# Patient Record
Sex: MALE | Race: BLACK OR AFRICAN AMERICAN | NOT HISPANIC OR LATINO | Employment: PART TIME | URBAN - METROPOLITAN AREA
[De-identification: names, ages, dates, MRNs, and addresses within clinical notes are randomized per-mention and may not be internally consistent; named-entity substitution may affect disease eponyms.]

---

## 2018-12-07 ENCOUNTER — LAB (OUTPATIENT)
Dept: LAB | Facility: HOSPITAL | Age: 58
End: 2018-12-07

## 2018-12-07 ENCOUNTER — OFFICE VISIT (OUTPATIENT)
Dept: FAMILY MEDICINE CLINIC | Facility: CLINIC | Age: 58
End: 2018-12-07

## 2018-12-07 VITALS
DIASTOLIC BLOOD PRESSURE: 72 MMHG | HEART RATE: 70 BPM | WEIGHT: 164 LBS | SYSTOLIC BLOOD PRESSURE: 116 MMHG | OXYGEN SATURATION: 99 %

## 2018-12-07 DIAGNOSIS — Z13.0 SCREENING FOR DEFICIENCY ANEMIA: ICD-10-CM

## 2018-12-07 DIAGNOSIS — Z13.220 SCREENING, LIPID: ICD-10-CM

## 2018-12-07 DIAGNOSIS — Z11.59 ENCOUNTER FOR SCREENING FOR OTHER VIRAL DISEASES: ICD-10-CM

## 2018-12-07 DIAGNOSIS — Z12.5 SCREENING FOR MALIGNANT NEOPLASM OF PROSTATE: ICD-10-CM

## 2018-12-07 DIAGNOSIS — H10.403 CHRONIC CONJUNCTIVITIS OF BOTH EYES, UNSPECIFIED CHRONIC CONJUNCTIVITIS TYPE: Primary | ICD-10-CM

## 2018-12-07 DIAGNOSIS — Z13.29 SCREENING FOR THYROID DISORDER: ICD-10-CM

## 2018-12-07 DIAGNOSIS — Z23 NEED FOR IMMUNIZATION AGAINST INFLUENZA: ICD-10-CM

## 2018-12-07 DIAGNOSIS — Z13.1 SCREENING FOR DIABETES MELLITUS: ICD-10-CM

## 2018-12-07 LAB
ALBUMIN SERPL-MCNC: 4.11 G/DL (ref 3.2–4.8)
ALBUMIN/GLOB SERPL: 2.1 G/DL (ref 1.5–2.5)
ALP SERPL-CCNC: 65 U/L (ref 25–100)
ALT SERPL W P-5'-P-CCNC: 21 U/L (ref 7–40)
ANION GAP SERPL CALCULATED.3IONS-SCNC: 3 MMOL/L (ref 3–11)
ARTICHOKE IGE QN: 120 MG/DL (ref 0–130)
AST SERPL-CCNC: 27 U/L (ref 0–33)
BASOPHILS # BLD AUTO: 0.02 10*3/MM3 (ref 0–0.2)
BASOPHILS NFR BLD AUTO: 0.5 % (ref 0–1)
BILIRUB SERPL-MCNC: 0.9 MG/DL (ref 0.3–1.2)
BUN BLD-MCNC: 13 MG/DL (ref 9–23)
BUN/CREAT SERPL: 18.3 (ref 7–25)
CALCIUM SPEC-SCNC: 9.1 MG/DL (ref 8.7–10.4)
CHLORIDE SERPL-SCNC: 106 MMOL/L (ref 99–109)
CHOLEST SERPL-MCNC: 195 MG/DL (ref 0–200)
CO2 SERPL-SCNC: 29 MMOL/L (ref 20–31)
CREAT BLD-MCNC: 0.71 MG/DL (ref 0.6–1.3)
DEPRECATED RDW RBC AUTO: 40.1 FL (ref 37–54)
EOSINOPHIL # BLD AUTO: 0.36 10*3/MM3 (ref 0–0.3)
EOSINOPHIL NFR BLD AUTO: 8.9 % (ref 0–3)
ERYTHROCYTE [DISTWIDTH] IN BLOOD BY AUTOMATED COUNT: 12.7 % (ref 11.3–14.5)
GFR SERPL CREATININE-BSD FRML MDRD: 137 ML/MIN/1.73
GLOBULIN UR ELPH-MCNC: 2 GM/DL
GLUCOSE BLD-MCNC: 86 MG/DL (ref 70–100)
HCT VFR BLD AUTO: 46.8 % (ref 38.9–50.9)
HCV AB SER DONR QL: NORMAL
HDLC SERPL-MCNC: 56 MG/DL (ref 40–60)
HGB BLD-MCNC: 15.8 G/DL (ref 13.1–17.5)
IMM GRANULOCYTES # BLD: 0 10*3/MM3 (ref 0–0.03)
IMM GRANULOCYTES NFR BLD: 0 % (ref 0–0.6)
LYMPHOCYTES # BLD AUTO: 1.92 10*3/MM3 (ref 0.6–4.8)
LYMPHOCYTES NFR BLD AUTO: 47.3 % (ref 24–44)
MCH RBC QN AUTO: 29.2 PG (ref 27–31)
MCHC RBC AUTO-ENTMCNC: 33.8 G/DL (ref 32–36)
MCV RBC AUTO: 86.3 FL (ref 80–99)
MONOCYTES # BLD AUTO: 0.47 10*3/MM3 (ref 0–1)
MONOCYTES NFR BLD AUTO: 11.6 % (ref 0–12)
NEUTROPHILS # BLD AUTO: 1.29 10*3/MM3 (ref 1.5–8.3)
NEUTROPHILS NFR BLD AUTO: 31.7 % (ref 41–71)
PLATELET # BLD AUTO: 212 10*3/MM3 (ref 150–450)
PMV BLD AUTO: 10.6 FL (ref 6–12)
POTASSIUM BLD-SCNC: 4.2 MMOL/L (ref 3.5–5.5)
PROT SERPL-MCNC: 6.1 G/DL (ref 5.7–8.2)
PSA SERPL-MCNC: 2.09 NG/ML (ref 0–4)
RBC # BLD AUTO: 5.42 10*6/MM3 (ref 4.2–5.76)
SODIUM BLD-SCNC: 138 MMOL/L (ref 132–146)
TRIGL SERPL-MCNC: 106 MG/DL (ref 0–150)
TSH SERPL DL<=0.05 MIU/L-ACNC: 2.05 MIU/ML (ref 0.35–5.35)
WBC NRBC COR # BLD: 4.06 10*3/MM3 (ref 3.5–10.8)

## 2018-12-07 PROCEDURE — G0103 PSA SCREENING: HCPCS | Performed by: FAMILY MEDICINE

## 2018-12-07 PROCEDURE — 80061 LIPID PANEL: CPT | Performed by: FAMILY MEDICINE

## 2018-12-07 PROCEDURE — 80053 COMPREHEN METABOLIC PANEL: CPT | Performed by: FAMILY MEDICINE

## 2018-12-07 PROCEDURE — 86803 HEPATITIS C AB TEST: CPT | Performed by: FAMILY MEDICINE

## 2018-12-07 PROCEDURE — 90686 IIV4 VACC NO PRSV 0.5 ML IM: CPT | Performed by: FAMILY MEDICINE

## 2018-12-07 PROCEDURE — 90471 IMMUNIZATION ADMIN: CPT | Performed by: FAMILY MEDICINE

## 2018-12-07 PROCEDURE — 84443 ASSAY THYROID STIM HORMONE: CPT | Performed by: FAMILY MEDICINE

## 2018-12-07 PROCEDURE — 99204 OFFICE O/P NEW MOD 45 MIN: CPT | Performed by: FAMILY MEDICINE

## 2018-12-07 PROCEDURE — 85025 COMPLETE CBC W/AUTO DIFF WBC: CPT | Performed by: FAMILY MEDICINE

## 2018-12-07 RX ORDER — KETOTIFEN FUMARATE 0.35 MG/ML
1 SOLUTION/ DROPS OPHTHALMIC 2 TIMES DAILY
Qty: 1 EACH | Refills: 2 | OUTPATIENT
Start: 2018-12-07 | End: 2020-01-04

## 2018-12-07 NOTE — PROGRESS NOTES
Ulises Hairston presents today to establish care.    Chief Complaint   Patient presents with   • Eye Problem     pain, watery    Declined .     Eye Problem    Both eyes are affected.This is a new problem. The current episode started more than 1 year ago (2010). The problem occurs intermittently. The problem has been unchanged. There was no injury mechanism. The pain is at a severity of 7/10. Associated symptoms include blurred vision, eye redness and a foreign body sensation. Pertinent negatives include no eye discharge or itching. He has tried nothing for the symptoms.           Review of Systems   Constitutional: Negative.    HENT: Negative.    Eyes: Positive for blurred vision, pain and redness. Negative for discharge and itching.   Respiratory: Negative.    Cardiovascular: Negative.    Gastrointestinal: Negative.    Endocrine: Negative.    Genitourinary: Negative.    Musculoskeletal: Negative.    Skin: Negative.    Neurological: Negative.    Hematological: Negative.    Psychiatric/Behavioral: Negative.         History reviewed. No pertinent past medical history.     History reviewed. No pertinent surgical history.     History reviewed. No pertinent family history.     Social History     Socioeconomic History   • Marital status:      Spouse name: Merlin Wayne   • Number of children: Not on file   • Years of education: Not on file   • Highest education level: Not on file   Social Needs   • Financial resource strain: Not on file   • Food insecurity - worry: Not on file   • Food insecurity - inability: Not on file   • Transportation needs - medical: Not on file   • Transportation needs - non-medical: Not on file   Occupational History   • Not on file   Tobacco Use   • Smoking status: Never Smoker   • Smokeless tobacco: Never Used   Substance and Sexual Activity   • Alcohol use: No     Frequency: Never   • Drug use: No   • Sexual activity: Not on file   Other Topics Concern   • Not on file    Social History Narrative   • Not on file        No current outpatient medications on file prior to visit.     No current facility-administered medications on file prior to visit.        No Known Allergies     Visit Vitals  /72 (BP Location: Left arm, Patient Position: Sitting, Cuff Size: Adult)   Pulse 70   Wt 74.4 kg (164 lb)   SpO2 99%        Physical Exam   Constitutional: He is oriented to person, place, and time. No distress.   HENT:   Nose: Nose normal.   Mouth/Throat: Oropharynx is clear and moist.   Eyes: EOM are normal. Pupils are equal, round, and reactive to light. Right eye exhibits no discharge. Left eye exhibits no discharge. Right conjunctiva is injected (  palpebral inflammation). Left conjunctiva is injected ( palpebral inflammation).   Neck: Neck supple. No thyromegaly present.   Cardiovascular: Normal rate and regular rhythm.   No murmur heard.  Pulses:       Posterior tibial pulses are 2+ on the right side, and 2+ on the left side.   Pulmonary/Chest: Effort normal and breath sounds normal.   Abdominal: Soft. There is no hepatosplenomegaly. There is no tenderness.   Lymphadenopathy:     He has no cervical adenopathy.   Neurological: He is alert and oriented to person, place, and time.   Skin: Skin is warm and dry. No rash noted.   Psychiatric: He has a normal mood and affect.   Vitals reviewed.       Immunization History   Administered Date(s) Administered   • FLUARIX/FLUZONE/AFLURIA/FLULAVAL QUAD 12/07/2018         No results found for this or any previous visit.     Ulises was seen today for eye problem.    Diagnoses and all orders for this visit:    Chronic conjunctivitis of both eyes, unspecified chronic conjunctivitis type  -     ketotifen (ZADITOR) 0.025 % ophthalmic solution; Administer 1 drop to both eyes 2 (Two) Times a Day.  New. No signs of infection. Start ketotifen.   Need for immunization against influenza  -     Fluarix/Fluzone/Afluria Quad>6 Months; Standing  -      Fluarix/Fluzone/Afluria Quad>6 Months    Screening for deficiency anemia  -     CBC & Differential; Future    Screening for thyroid disorder  -     TSH Rfx On Abnormal To Free T4; Future    Screening, lipid  -     Lipid Panel; Future    Screening for diabetes mellitus  -     Comprehensive Metabolic Panel; Future    Encounter for screening for other viral diseases  -     Hepatitis C Antibody; Future    Screening for malignant neoplasm of prostate  -     PSA Screen; Future    Check fasting labs today, return for physical and to review labs in detail.        Return for Annual physical.

## 2019-10-25 ENCOUNTER — OFFICE VISIT (OUTPATIENT)
Dept: FAMILY MEDICINE CLINIC | Facility: CLINIC | Age: 59
End: 2019-10-25

## 2019-10-25 VITALS
OXYGEN SATURATION: 99 % | SYSTOLIC BLOOD PRESSURE: 112 MMHG | BODY MASS INDEX: 32.68 KG/M2 | HEIGHT: 62 IN | HEART RATE: 80 BPM | TEMPERATURE: 97.6 F | WEIGHT: 177.6 LBS | DIASTOLIC BLOOD PRESSURE: 64 MMHG

## 2019-10-25 DIAGNOSIS — M79.671 FOOT PAIN, RIGHT: Primary | ICD-10-CM

## 2019-10-25 DIAGNOSIS — B07.0 PLANTAR WART: ICD-10-CM

## 2019-10-25 PROCEDURE — 17110 DESTRUCTION B9 LES UP TO 14: CPT | Performed by: FAMILY MEDICINE

## 2019-10-25 PROCEDURE — 99213 OFFICE O/P EST LOW 20 MIN: CPT | Performed by: FAMILY MEDICINE

## 2019-10-25 NOTE — PROGRESS NOTES
"Chief Complaint   Patient presents with   • Foot Injury     pain when walking and when pressure is appplied        used for visit.     HPI   Foot pain:  Right pain for 2 years. Bump/rash there. Sole of foot. Hurts to walk on it.   Not tried any OTC treatments. She had the same thing in his hand and it was burned off.     Review of Systems   Musculoskeletal: Positive for arthralgias.        Current Outpatient Medications on File Prior to Visit   Medication Sig Dispense Refill   • ketotifen (ZADITOR) 0.025 % ophthalmic solution Administer 1 drop to both eyes 2 (Two) Times a Day. 1 each 2     No current facility-administered medications on file prior to visit.        No Known Allergies    History reviewed. No pertinent past medical history.     History reviewed. No pertinent surgical history.     History reviewed. No pertinent family history.     Social History     Socioeconomic History   • Marital status:      Spouse name: Merlin Wayne   • Number of children: Not on file   • Years of education: Not on file   • Highest education level: Not on file   Tobacco Use   • Smoking status: Never Smoker   • Smokeless tobacco: Never Used   Substance and Sexual Activity   • Alcohol use: No     Frequency: Never   • Drug use: No        Visit Vitals  /64 (BP Location: Right arm, Patient Position: Sitting, Cuff Size: Adult)   Pulse 80   Temp 97.6 °F (36.4 °C) (Temporal)   Ht 157.5 cm (62\")   Wt 80.6 kg (177 lb 9.6 oz)   SpO2 99%   BMI 32.48 kg/m²        Physical Exam   Constitutional: No distress.        Neurological: He is alert.   Psychiatric: He has a normal mood and affect.        Cryotherapy, Skin Lesion  Date/Time: 10/25/2019 3:45 PM  Performed by: Jessie Rasheed MD  Authorized by: Jessie Rasheed MD   Local anesthesia used: no    Anesthesia:  Local anesthesia used: no    Sedation:  Patient sedated: no    Patient tolerance: Patient tolerated the procedure well with no " immediate complications  Comments: Cleaned area with alcohol pad. Cryotherapy x2.           Ulises was seen today for foot injury.    Diagnoses and all orders for this visit:    Foot pain, right    Plantar wart    New.  used for verbal consent cryotherapy.     Return if symptoms worsen or fail to improve.      Dr. Jessie Tobar

## 2020-01-04 ENCOUNTER — HOSPITAL ENCOUNTER (EMERGENCY)
Facility: HOSPITAL | Age: 60
Discharge: HOME | End: 2020-01-04
Attending: EMERGENCY MEDICINE | Admitting: EMERGENCY MEDICINE

## 2020-01-04 ENCOUNTER — APPOINTMENT (OUTPATIENT)
Dept: GENERAL RADIOLOGY | Facility: HOSPITAL | Age: 60
End: 2020-01-04

## 2020-01-04 VITALS
WEIGHT: 178 LBS | SYSTOLIC BLOOD PRESSURE: 110 MMHG | HEART RATE: 59 BPM | OXYGEN SATURATION: 97 % | TEMPERATURE: 98.1 F | HEIGHT: 70 IN | RESPIRATION RATE: 18 BRPM | BODY MASS INDEX: 25.48 KG/M2 | DIASTOLIC BLOOD PRESSURE: 83 MMHG

## 2020-01-04 DIAGNOSIS — J18.9 PNEUMONITIS: Primary | ICD-10-CM

## 2020-01-04 LAB
ALBUMIN SERPL-MCNC: 4.2 G/DL (ref 3.5–5.2)
ALBUMIN/GLOB SERPL: 1.5 G/DL
ALP SERPL-CCNC: 53 U/L (ref 39–117)
ALT SERPL W P-5'-P-CCNC: 16 U/L (ref 1–41)
ANION GAP SERPL CALCULATED.3IONS-SCNC: 12 MMOL/L (ref 5–15)
AST SERPL-CCNC: 19 U/L (ref 1–40)
BASOPHILS # BLD AUTO: 0.01 10*3/MM3 (ref 0–0.2)
BASOPHILS NFR BLD AUTO: 0.3 % (ref 0–1.5)
BILIRUB SERPL-MCNC: 0.5 MG/DL (ref 0.2–1.2)
BUN BLD-MCNC: 11 MG/DL (ref 8–23)
BUN/CREAT SERPL: 16.2 (ref 7–25)
CALCIUM SPEC-SCNC: 9.3 MG/DL (ref 8.6–10.5)
CHLORIDE SERPL-SCNC: 104 MMOL/L (ref 98–107)
CO2 SERPL-SCNC: 24 MMOL/L (ref 22–29)
CREAT BLD-MCNC: 0.68 MG/DL (ref 0.76–1.27)
DEPRECATED RDW RBC AUTO: 38.2 FL (ref 37–54)
EOSINOPHIL # BLD AUTO: 0.28 10*3/MM3 (ref 0–0.4)
EOSINOPHIL NFR BLD AUTO: 7.9 % (ref 0.3–6.2)
ERYTHROCYTE [DISTWIDTH] IN BLOOD BY AUTOMATED COUNT: 12 % (ref 12.3–15.4)
GFR SERPL CREATININE-BSD FRML MDRD: 143 ML/MIN/1.73
GLOBULIN UR ELPH-MCNC: 2.8 GM/DL
GLUCOSE BLD-MCNC: 133 MG/DL (ref 65–99)
HCT VFR BLD AUTO: 45.5 % (ref 37.5–51)
HGB BLD-MCNC: 15.6 G/DL (ref 13–17.7)
IMM GRANULOCYTES # BLD AUTO: 0 10*3/MM3 (ref 0–0.05)
IMM GRANULOCYTES NFR BLD AUTO: 0 % (ref 0–0.5)
LYMPHOCYTES # BLD AUTO: 1.61 10*3/MM3 (ref 0.7–3.1)
LYMPHOCYTES NFR BLD AUTO: 45.4 % (ref 19.6–45.3)
MCH RBC QN AUTO: 29.5 PG (ref 26.6–33)
MCHC RBC AUTO-ENTMCNC: 34.3 G/DL (ref 31.5–35.7)
MCV RBC AUTO: 86.2 FL (ref 79–97)
MONOCYTES # BLD AUTO: 0.39 10*3/MM3 (ref 0.1–0.9)
MONOCYTES NFR BLD AUTO: 11 % (ref 5–12)
NEUTROPHILS # BLD AUTO: 1.26 10*3/MM3 (ref 1.7–7)
NEUTROPHILS NFR BLD AUTO: 35.4 % (ref 42.7–76)
NRBC BLD AUTO-RTO: 0 /100 WBC (ref 0–0.2)
NT-PROBNP SERPL-MCNC: 36.7 PG/ML (ref 5–900)
PLATELET # BLD AUTO: 186 10*3/MM3 (ref 140–450)
PMV BLD AUTO: 9.7 FL (ref 6–12)
POTASSIUM BLD-SCNC: 3.8 MMOL/L (ref 3.5–5.2)
PROT SERPL-MCNC: 7 G/DL (ref 6–8.5)
RBC # BLD AUTO: 5.28 10*6/MM3 (ref 4.14–5.8)
SODIUM BLD-SCNC: 140 MMOL/L (ref 136–145)
TROPONIN T SERPL-MCNC: <0.01 NG/ML (ref 0–0.03)
WBC NRBC COR # BLD: 3.55 10*3/MM3 (ref 3.4–10.8)

## 2020-01-04 PROCEDURE — 71045 X-RAY EXAM CHEST 1 VIEW: CPT

## 2020-01-04 PROCEDURE — 80053 COMPREHEN METABOLIC PANEL: CPT | Performed by: EMERGENCY MEDICINE

## 2020-01-04 PROCEDURE — 85025 COMPLETE CBC W/AUTO DIFF WBC: CPT | Performed by: EMERGENCY MEDICINE

## 2020-01-04 PROCEDURE — 93005 ELECTROCARDIOGRAM TRACING: CPT | Performed by: EMERGENCY MEDICINE

## 2020-01-04 PROCEDURE — 84484 ASSAY OF TROPONIN QUANT: CPT | Performed by: EMERGENCY MEDICINE

## 2020-01-04 PROCEDURE — 99283 EMERGENCY DEPT VISIT LOW MDM: CPT

## 2020-01-04 PROCEDURE — 83880 ASSAY OF NATRIURETIC PEPTIDE: CPT | Performed by: EMERGENCY MEDICINE

## 2020-01-04 RX ORDER — AZITHROMYCIN 250 MG/1
TABLET, FILM COATED ORAL
Qty: 6 TABLET | Refills: 0 | Status: SHIPPED | OUTPATIENT
Start: 2020-01-04 | End: 2020-01-11

## 2020-01-04 RX ORDER — BENZONATATE 200 MG/1
200 CAPSULE ORAL 3 TIMES DAILY PRN
Qty: 15 CAPSULE | Refills: 0 | Status: SHIPPED | OUTPATIENT
Start: 2020-01-04 | End: 2020-01-11

## 2020-01-04 RX ORDER — SODIUM CHLORIDE 0.9 % (FLUSH) 0.9 %
10 SYRINGE (ML) INJECTION AS NEEDED
Status: DISCONTINUED | OUTPATIENT
Start: 2020-01-04 | End: 2020-01-04 | Stop reason: HOSPADM

## 2020-01-04 NOTE — ED PROVIDER NOTES
Subjective   Ulises Hairston is a 60 y.o. male who presents to the ED with complaints of shortness of breath and coughing. He has been coughing for 2 weeks without producing mucus and reports a subjective fever. He denies any chest pain. He reports that he has taken DayQuil and NyQuil for symptom relief with mild relief. He has not had any sick contacts recently. Mr. Hairston has no history of pulmonary or cardiac issues. He denies any history of TB, emphysema, asthma, COPD, CHF, pneumonia, or MI. He is a non-smoker. He did not receive his flu vaccination this season. Mr Hairston has no more acute complaints at this time.       History provided by:  Patient   used: No    Shortness of Breath   Severity:  Moderate  Onset quality:  Sudden  Duration:  2 weeks  Timing:  Constant  Progression:  Unchanged  Chronicity:  New  Relieved by:  Nothing  Worsened by:  Nothing  Ineffective treatments: Nyquil and Dayquil.  Associated symptoms: cough (unproductive) and fever (subjective)    Associated symptoms: no chest pain    Risk factors: no family hx of DVT, no hx of PE/DVT, no obesity and no tobacco use        Review of Systems   Constitutional: Positive for fever (subjective).   Respiratory: Positive for cough (unproductive) and shortness of breath.    Cardiovascular: Negative for chest pain.   All other systems reviewed and are negative.      History reviewed. No pertinent past medical history.    No Known Allergies    History reviewed. No pertinent surgical history.    History reviewed. No pertinent family history.    Social History     Socioeconomic History   • Marital status:      Spouse name: Merlin Wayne   • Number of children: Not on file   • Years of education: Not on file   • Highest education level: Not on file   Tobacco Use   • Smoking status: Never Smoker   • Smokeless tobacco: Never Used   Substance and Sexual Activity   • Alcohol use: No     Frequency: Never   • Drug use: No          Objective   Physical Exam   Constitutional: He is oriented to person, place, and time. He appears well-developed and well-nourished. No distress.   Patient in no acute distress.   HENT:   Head: Normocephalic and atraumatic.   Eyes: No scleral icterus.   Neck: Normal range of motion. Neck supple.   Cardiovascular: Normal rate, regular rhythm and normal heart sounds.   Pulmonary/Chest: Effort normal and breath sounds normal. No respiratory distress. He has no wheezes. He has no rhonchi. He has no rales.   Respiratory rate is 16. No audible wheezes, rales, or rhonchi.   Abdominal: Soft. Bowel sounds are normal.   Musculoskeletal: Normal range of motion. He exhibits no edema (pretibial) or deformity.   No pretibial edema.   Neurological: He is alert and oriented to person, place, and time.   Skin: Skin is warm and dry.   Psychiatric: He has a normal mood and affect. His behavior is normal.   Nursing note and vitals reviewed.      Procedures         ED Course  ED Course as of Jan 04 1847   Sat Jan 04, 2020   1736 Updated the patient on labs results thus far. - MAS    [NP]      ED Course User Index  [NP] Shabana Palma       Recent Results (from the past 24 hour(s))   Comprehensive Metabolic Panel    Collection Time: 01/04/20  4:54 PM   Result Value Ref Range    Glucose 133 (H) 65 - 99 mg/dL    BUN 11 8 - 23 mg/dL    Creatinine 0.68 (L) 0.76 - 1.27 mg/dL    Sodium 140 136 - 145 mmol/L    Potassium 3.8 3.5 - 5.2 mmol/L    Chloride 104 98 - 107 mmol/L    CO2 24.0 22.0 - 29.0 mmol/L    Calcium 9.3 8.6 - 10.5 mg/dL    Total Protein 7.0 6.0 - 8.5 g/dL    Albumin 4.20 3.50 - 5.20 g/dL    ALT (SGPT) 16 1 - 41 U/L    AST (SGOT) 19 1 - 40 U/L    Alkaline Phosphatase 53 39 - 117 U/L    Total Bilirubin 0.5 0.2 - 1.2 mg/dL    eGFR  African Amer 143 >60 mL/min/1.73    Globulin 2.8 gm/dL    A/G Ratio 1.5 g/dL    BUN/Creatinine Ratio 16.2 7.0 - 25.0    Anion Gap 12.0 5.0 - 15.0 mmol/L   BNP    Collection Time: 01/04/20  4:54 PM  "  Result Value Ref Range    proBNP 36.7 5.0 - 900.0 pg/mL   Troponin    Collection Time: 01/04/20  4:54 PM   Result Value Ref Range    Troponin T <0.010 0.000 - 0.030 ng/mL   CBC Auto Differential    Collection Time: 01/04/20  4:54 PM   Result Value Ref Range    WBC 3.55 3.40 - 10.80 10*3/mm3    RBC 5.28 4.14 - 5.80 10*6/mm3    Hemoglobin 15.6 13.0 - 17.7 g/dL    Hematocrit 45.5 37.5 - 51.0 %    MCV 86.2 79.0 - 97.0 fL    MCH 29.5 26.6 - 33.0 pg    MCHC 34.3 31.5 - 35.7 g/dL    RDW 12.0 (L) 12.3 - 15.4 %    RDW-SD 38.2 37.0 - 54.0 fl    MPV 9.7 6.0 - 12.0 fL    Platelets 186 140 - 450 10*3/mm3    Neutrophil % 35.4 (L) 42.7 - 76.0 %    Lymphocyte % 45.4 (H) 19.6 - 45.3 %    Monocyte % 11.0 5.0 - 12.0 %    Eosinophil % 7.9 (H) 0.3 - 6.2 %    Basophil % 0.3 0.0 - 1.5 %    Immature Grans % 0.0 0.0 - 0.5 %    Neutrophils, Absolute 1.26 (L) 1.70 - 7.00 10*3/mm3    Lymphocytes, Absolute 1.61 0.70 - 3.10 10*3/mm3    Monocytes, Absolute 0.39 0.10 - 0.90 10*3/mm3    Eosinophils, Absolute 0.28 0.00 - 0.40 10*3/mm3    Basophils, Absolute 0.01 0.00 - 0.20 10*3/mm3    Immature Grans, Absolute 0.00 0.00 - 0.05 10*3/mm3    nRBC 0.0 0.0 - 0.2 /100 WBC     Note: In addition to lab results from this visit, the labs listed above may include labs taken at another facility or during a different encounter within the last 24 hours. Please correlate lab times with ED admission and discharge times for further clarification of the services performed during this visit.    XR Chest 1 View   Preliminary Result   Peribronchial thickening and interstitial change suggesting   possible viral syndrome.                 Vitals:    01/04/20 1625 01/04/20 1634   BP:  110/83   BP Location:  Right arm   Patient Position:  Sitting   Pulse: 59    Resp: 18    Temp: 98.1 °F (36.7 °C)    TempSrc: Oral    SpO2:  97%   Weight: 80.7 kg (178 lb)    Height: 177.8 cm (70\")      Medications   sodium chloride 0.9 % flush 10 mL (has no administration in time range) "   sodium chloride 0.9 % flush 10 mL (has no administration in time range)     ECG/EMG Results (last 24 hours)     Procedure Component Value Units Date/Time    ECG 12 Lead [[id number]] Collected:  01/04/20 1658     Updated:  01/04/20 1700        ECG 12 Lead   Final Result   Test Reason : SOA Protocol   Blood Pressure : **/** mmHG   Vent. Rate : 058 BPM     Atrial Rate : 058 BPM      P-R Int : 190 ms          QRS Dur : 096 ms       QT Int : 420 ms       P-R-T Axes : 020 -18 003 degrees      QTc Int : 412 ms      Sinus bradycardia   Otherwise normal ECG   No previous ECGs available   Confirmed by JOSIE ERNST MD ([id number]) on 1/5/2020 1:40:55 PM      Referred By:  EDMD           Confirmed By:JOSIE ERNST MD                        [facility]    Final diagnoses:   Pneumonitis       Documentation assistance provided by quentin Dunn.  Information recorded by the scribe was done at my direction and has been verified and validated by me.     Yonathan Dunn  01/04/20 1809       Shabana Palma  01/04/20 1847       Josie Ernst MD  01/06/20 2016

## 2020-01-11 ENCOUNTER — OFFICE VISIT (OUTPATIENT)
Dept: FAMILY MEDICINE CLINIC | Facility: CLINIC | Age: 60
End: 2020-01-11

## 2020-01-11 VITALS
BODY MASS INDEX: 23.28 KG/M2 | DIASTOLIC BLOOD PRESSURE: 64 MMHG | SYSTOLIC BLOOD PRESSURE: 108 MMHG | OXYGEN SATURATION: 98 % | HEIGHT: 70 IN | HEART RATE: 83 BPM | WEIGHT: 162.6 LBS

## 2020-01-11 DIAGNOSIS — B07.0 PLANTAR WART: ICD-10-CM

## 2020-01-11 DIAGNOSIS — M79.671 FOOT PAIN, RIGHT: Primary | ICD-10-CM

## 2020-01-11 DIAGNOSIS — L84 CALLUS: ICD-10-CM

## 2020-01-11 PROCEDURE — 17110 DESTRUCTION B9 LES UP TO 14: CPT | Performed by: FAMILY MEDICINE

## 2020-01-11 PROCEDURE — 99213 OFFICE O/P EST LOW 20 MIN: CPT | Performed by: FAMILY MEDICINE

## 2020-01-11 NOTE — PROGRESS NOTES
"Chief Complaint   Patient presents with   • Foot Pain     something on the bottom of right foot, painful to touch or when walking.        Foot Pain   This is a recurrent problem. The current episode started more than 1 month ago. The problem occurs daily. The problem has been gradually worsening. Associated symptoms include arthralgias. The symptoms are aggravated by walking (pressure). Treatments tried: cryotherapy. The treatment provided no relief.      He also has severely dry heels.        Review of Systems   Musculoskeletal: Positive for arthralgias.   Skin: Positive for dry skin.        Current Outpatient Medications on File Prior to Visit   Medication Sig Dispense Refill   • [DISCONTINUED] azithromycin (ZITHROMAX Z-SIMÓN) 250 MG tablet Take 2 tablets the first day, then 1 tablet daily for 4 days. 6 tablet 0   • [DISCONTINUED] benzonatate (TESSALON) 200 MG capsule Take 1 capsule by mouth 3 (Three) Times a Day As Needed for Cough. 15 capsule 0     No current facility-administered medications on file prior to visit.        No Known Allergies    History reviewed. No pertinent past medical history.     History reviewed. No pertinent surgical history.     History reviewed. No pertinent family history.     Social History     Socioeconomic History   • Marital status:      Spouse name: Merlin Wayne   • Number of children: Not on file   • Years of education: Not on file   • Highest education level: Not on file   Tobacco Use   • Smoking status: Never Smoker   • Smokeless tobacco: Never Used   Substance and Sexual Activity   • Alcohol use: No     Frequency: Never   • Drug use: No        Visit Vitals  /64 (BP Location: Left arm, Patient Position: Sitting, Cuff Size: Adult)   Pulse 83   Ht 177.8 cm (70\")   Wt 73.8 kg (162 lb 9.6 oz)   SpO2 98%   BMI 23.33 kg/m²        Physical Exam               Cryotherapy, Skin Lesion  Date/Time: 1/11/2020 12:09 PM  Performed by: Jessie Rasheed MD  Authorized " by: Jessie Rasheed MD   Preparation: Patient was prepped and draped in the usual sterile fashion.  Local anesthesia used: no    Anesthesia:  Local anesthesia used: no    Sedation:  Patient sedated: no    Patient tolerance: Patient tolerated the procedure well with no immediate complications  Comments: After informed consent area was prepped with alcohol.  4 separate plantar warts were treated with cryotherapy x2 rounds.          Ulises was seen today for foot pain.    Diagnoses and all orders for this visit:    Foot pain, right  -     Cryotherapy, Skin Lesion    Plantar wart  -     Cryotherapy, Skin Lesion    Callus    Recommend trying over-the-counter Lac-Hydrin lotion to help with callus.      Dr. Jessie Tobar

## 2020-01-27 ENCOUNTER — OFFICE VISIT (OUTPATIENT)
Dept: FAMILY MEDICINE CLINIC | Facility: CLINIC | Age: 60
End: 2020-01-27

## 2020-01-27 VITALS
SYSTOLIC BLOOD PRESSURE: 102 MMHG | HEART RATE: 95 BPM | WEIGHT: 167 LBS | BODY MASS INDEX: 23.91 KG/M2 | DIASTOLIC BLOOD PRESSURE: 68 MMHG | HEIGHT: 70 IN | OXYGEN SATURATION: 95 %

## 2020-01-27 DIAGNOSIS — M79.671 RIGHT FOOT PAIN: Primary | ICD-10-CM

## 2020-01-27 DIAGNOSIS — B07.0 PLANTAR WART: ICD-10-CM

## 2020-01-27 PROCEDURE — 17110 DESTRUCTION B9 LES UP TO 14: CPT | Performed by: FAMILY MEDICINE

## 2020-01-27 PROCEDURE — 99212 OFFICE O/P EST SF 10 MIN: CPT | Performed by: FAMILY MEDICINE

## 2020-01-27 NOTE — PROGRESS NOTES
"Chief Complaint   Patient presents with   • Foot Pain     right        HPI      Patient has had ongoing right foot pain for the past 2 years.  Pain located on the bottom of his foot.  It hurts to stand or walk when barefoot.  Pain has been worsening.  Prior treatments include cryotherapy. Cryotherapy helped for a few days.           Review of Systems   Musculoskeletal:        Foot pain   Skin: Positive for skin lesions.        No current outpatient medications on file.     No current facility-administered medications for this visit.        No Known Allergies    Patient Active Problem List   Diagnosis   • Plantar wart       Social History     Social History Narrative   • Not on file       Visit Vitals  /68 (BP Location: Left arm, Patient Position: Sitting, Cuff Size: Adult)   Pulse 95   Ht 177.8 cm (70\")   Wt 75.8 kg (167 lb)   SpO2 95%   BMI 23.96 kg/m²          Physical Exam   Musculoskeletal:   Right foot for plantar warts.  One on arch with moderate tenderness and callus.  No bleeding.  Blisters.     Cryotherapy, Skin Lesion  Date/Time: 1/27/2020 4:00 PM  Performed by: Jessie Rasheed MD  Authorized by: Jessie Rasheed MD   Preparation: Patient was prepped and draped in the usual sterile fashion.  Local anesthesia used: no    Anesthesia:  Local anesthesia used: no    Sedation:  Patient sedated: no    Patient tolerance: Patient tolerated the procedure well with no immediate complications  Comments: 4 lesions treated with cryotherapy x2 rounds.             Ulises was seen today for foot pain.    Diagnoses and all orders for this visit:    Right foot pain  -     Cryotherapy, Skin Lesion    Plantar wart    Consent obtained.  Cryotherapy today x4.    Return if symptoms worsen or fail to improve.    Dr. Jessie Tobar  "

## 2020-09-22 ENCOUNTER — CLINICAL SUPPORT (OUTPATIENT)
Dept: FAMILY MEDICINE CLINIC | Facility: CLINIC | Age: 60
End: 2020-09-22

## 2020-09-22 DIAGNOSIS — Z23 FLU VACCINE NEED: Primary | ICD-10-CM

## 2020-09-22 PROCEDURE — 90471 IMMUNIZATION ADMIN: CPT | Performed by: FAMILY MEDICINE

## 2020-09-22 PROCEDURE — 90686 IIV4 VACC NO PRSV 0.5 ML IM: CPT | Performed by: FAMILY MEDICINE

## 2020-11-10 ENCOUNTER — OFFICE VISIT (OUTPATIENT)
Dept: FAMILY MEDICINE CLINIC | Facility: CLINIC | Age: 60
End: 2020-11-10

## 2020-11-10 VITALS
WEIGHT: 178.8 LBS | SYSTOLIC BLOOD PRESSURE: 108 MMHG | BODY MASS INDEX: 25.6 KG/M2 | OXYGEN SATURATION: 98 % | HEART RATE: 96 BPM | HEIGHT: 70 IN | DIASTOLIC BLOOD PRESSURE: 74 MMHG

## 2020-11-10 DIAGNOSIS — H57.13 PAIN OF BOTH EYES: Primary | ICD-10-CM

## 2020-11-10 PROCEDURE — 99213 OFFICE O/P EST LOW 20 MIN: CPT | Performed by: FAMILY MEDICINE

## 2020-11-10 NOTE — PROGRESS NOTES
"Chief Complaint   Patient presents with   • Eye Pain     bilateral eye pain, has been going on for a long time, at least one year.        Eye Pain   Both eyes are affected.This is a chronic problem. The current episode started more than 1 year ago. The problem occurs constantly. There was no injury mechanism. Associated symptoms include blurred vision. Pertinent negatives include no eye discharge or eye redness.      He has sinus pain and headache associated with eye pain. Has to use glasses to read. Worse vision on left eye. He rubs his eyes sometimes. He hasn't seen an eye doctor for a long time since he lived in [place] 10 years ago.       Review of Systems   HENT: Positive for congestion.    Eyes: Positive for blurred vision and pain. Negative for discharge and redness.   Neurological: Positive for headache.        Patient Active Problem List   Diagnosis   • Plantar wart       No current outpatient medications on file.     No current facility-administered medications for this visit.        No Known Allergies    History reviewed. No pertinent past medical history.     History reviewed. No pertinent surgical history.     History reviewed. No pertinent family history.     Social History     Socioeconomic History   • Marital status:      Spouse name: Merlin Wayne   • Number of children: Not on file   • Years of education: Not on file   • Highest education level: Not on file   Tobacco Use   • Smoking status: Never Smoker   • Smokeless tobacco: Never Used   Substance and Sexual Activity   • Alcohol use: No     Frequency: Never   • Drug use: No        Vitals:    11/10/20 1559   BP: 108/74   BP Location: Left arm   Patient Position: Sitting   Cuff Size: Adult   Pulse: 96   SpO2: 98%   Weight: 81.1 kg (178 lb 12.8 oz)   Height: 177.8 cm (70\")      Body mass index is 25.66 kg/m².    Physical Exam  Constitutional:       General: He is not in acute distress.     Appearance: He is normal weight.   HENT:      " Nose: Nose normal.      Right Sinus: No maxillary sinus tenderness or frontal sinus tenderness.      Left Sinus: No maxillary sinus tenderness or frontal sinus tenderness.   Eyes:      General: No scleral icterus.        Right eye: No discharge.         Left eye: No discharge.      Extraocular Movements: Extraocular movements intact.      Conjunctiva/sclera: Conjunctivae normal.      Pupils: Pupils are equal, round, and reactive to light.   Neurological:      Mental Status: Mental status is at baseline.   Psychiatric:         Mood and Affect: Mood normal.         Behavior: Behavior normal.         Thought Content: Thought content normal.         Judgment: Judgment normal.              Diagnoses and all orders for this visit:    1. Pain of both eyes (Primary)  -     Ambulatory Referral to Ophthalmology    Recommend formal eye exam with ophthalmology.    Return if symptoms worsen or fail to improve.    Dr. Jessie Tobar

## 2020-12-16 ENCOUNTER — TELEPHONE (OUTPATIENT)
Dept: FAMILY MEDICINE CLINIC | Facility: CLINIC | Age: 60
End: 2020-12-16

## 2020-12-16 NOTE — TELEPHONE ENCOUNTER
PATIENT CANCELLED HIS EYE APPT ON 2/10/2021 AT  BECAUSE IT WAS MADE IN THE MORNING. THEY COULD NOT GET HIM IN UNTIL 3/17/2021 AND WANTS SOONER. WOULD LIKE US TO FIND SOMEWHERE ELSE HE CAN GO FOR AN AFTERNOON APPT AND AS SOON AS POSSIBLE. 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     PATIENT REQUESTS CALLS TO HIM COME BETWEEN 1 AND 5 PM WHILE HE IS NOT AT WORK.

## 2021-06-14 ENCOUNTER — OFFICE VISIT (OUTPATIENT)
Dept: FAMILY MEDICINE CLINIC | Facility: CLINIC | Age: 61
End: 2021-06-14

## 2021-06-14 VITALS
TEMPERATURE: 98.1 F | WEIGHT: 185.2 LBS | HEART RATE: 78 BPM | SYSTOLIC BLOOD PRESSURE: 100 MMHG | RESPIRATION RATE: 16 BRPM | OXYGEN SATURATION: 95 % | BODY MASS INDEX: 26.51 KG/M2 | HEIGHT: 70 IN | DIASTOLIC BLOOD PRESSURE: 64 MMHG

## 2021-06-14 DIAGNOSIS — Z12.5 SCREENING FOR PROSTATE CANCER: ICD-10-CM

## 2021-06-14 DIAGNOSIS — M54.50 CHRONIC MIDLINE LOW BACK PAIN WITHOUT SCIATICA: ICD-10-CM

## 2021-06-14 DIAGNOSIS — Z00.00 WELL ADULT EXAM: Primary | ICD-10-CM

## 2021-06-14 DIAGNOSIS — G89.29 CHRONIC MIDLINE LOW BACK PAIN WITHOUT SCIATICA: ICD-10-CM

## 2021-06-14 DIAGNOSIS — G89.29 CHRONIC MIDLINE THORACIC BACK PAIN: ICD-10-CM

## 2021-06-14 DIAGNOSIS — Z12.11 SCREENING FOR COLON CANCER: ICD-10-CM

## 2021-06-14 DIAGNOSIS — M54.6 CHRONIC MIDLINE THORACIC BACK PAIN: ICD-10-CM

## 2021-06-14 PROCEDURE — 99213 OFFICE O/P EST LOW 20 MIN: CPT | Performed by: FAMILY MEDICINE

## 2021-06-14 PROCEDURE — 80053 COMPREHEN METABOLIC PANEL: CPT | Performed by: FAMILY MEDICINE

## 2021-06-14 PROCEDURE — 85025 COMPLETE CBC W/AUTO DIFF WBC: CPT | Performed by: FAMILY MEDICINE

## 2021-06-14 PROCEDURE — G0103 PSA SCREENING: HCPCS | Performed by: FAMILY MEDICINE

## 2021-06-14 PROCEDURE — 84443 ASSAY THYROID STIM HORMONE: CPT | Performed by: FAMILY MEDICINE

## 2021-06-14 PROCEDURE — 80061 LIPID PANEL: CPT | Performed by: FAMILY MEDICINE

## 2021-06-14 PROCEDURE — 99396 PREV VISIT EST AGE 40-64: CPT | Performed by: FAMILY MEDICINE

## 2021-06-14 PROCEDURE — 83036 HEMOGLOBIN GLYCOSYLATED A1C: CPT | Performed by: FAMILY MEDICINE

## 2021-06-14 RX ORDER — LIDOCAINE 50 MG/G
1 PATCH TOPICAL EVERY 24 HOURS
Qty: 60 PATCH | Refills: 2 | Status: SHIPPED | OUTPATIENT
Start: 2021-06-14 | End: 2021-08-23

## 2021-06-14 NOTE — PROGRESS NOTES
Ulises Hairston is a 61 y.o. male who presents today to [facility].    Chief Complaint   Patient presents with   • Establish Care     Back pain in lower back        He was previously seen by Dr. Hyacinth Tobar and wanted to switch to a new provider because this clinic is closer to his home. He has not been seen in office since November of 2020. His last full labs were in December of 2018. He is due for his annual exam today. He has no chronic disease that he knows of and he takes no daily medications. He is up to date on his immunizations as far as he knows including is fully vaccinated for COVID 19. He eats a well-balanced diet and walks for exercise as well as has a physical job. He is sexually active with one female and is not interested in screening for STIs today. He has never had a colonoscopy in the past.       He complains today of localized back pain which has been going on for the past 2 years. He denies a known specific injury. It is worse with lifting and straining. He frequently packs up and picks up heavy boxes at work frequently that are at least 40 pounds. He is taking Ibuprofen at home to help with the pain, which gives him some relief. He does not use ice, heat, or any topical medications. He rates his pain as up to an 8/10. It frequently keeps him from sleep at night. It occasionally shoot up towards his upper back and neck. He denies radiation of this pain to his legs or associated numbness and tingling. He was hoping it would go away on it's own, but it has persisted. He has never raheem physical therapy for his back.        Review of Systems   Constitutional: Negative for activity change, appetite change, chills, fatigue, fever, unexpected weight gain and unexpected weight loss.   HENT: Negative for congestion, ear pain and sore throat.    Eyes: Negative for blurred vision and visual disturbance.   Respiratory: Negative for cough, shortness of breath and wheezing.    Cardiovascular:  Negative for chest pain and palpitations.   Gastrointestinal: Negative for abdominal pain, blood in stool, constipation, diarrhea, nausea, vomiting and GERD.   Endocrine: Negative for polydipsia and polyuria.   Genitourinary: Negative for difficulty urinating, frequency and urgency.   Musculoskeletal: Positive for back pain (back pain x 2 years ). Negative for joint swelling and neck pain.   Skin: Negative for rash and skin lesions.   Allergic/Immunologic: Negative for environmental allergies.   Neurological: Negative for seizures and syncope.   Hematological: Does not bruise/bleed easily.   Psychiatric/Behavioral: Negative for suicidal ideas.        PHQ-9 Depression Screening  Little interest or pleasure in doing things? 0   Feeling down, depressed, or hopeless? 0   Trouble falling or staying asleep, or sleeping too much?     Feeling tired or having little energy?     Poor appetite or overeating?     Feeling bad about yourself - or that you are a failure or have let yourself or your family down?     Trouble concentrating on things, such as reading the newspaper or watching television?     Moving or speaking so slowly that other people could have noticed? Or the opposite - being so fidgety or restless that you have been moving around a lot more than usual?     Thoughts that you would be better off dead, or of hurting yourself in some way?     PHQ-9 Total Score 0   If you checked off any problems, how difficult have these problems made it for you to do your work, take care of things at home, or get along with other people?         History reviewed. No pertinent past medical history.     History reviewed. No pertinent surgical history.     Family History   Problem Relation Age of Onset   • Other Mother         unknown   • Other Father         unknown   • Other Maternal Grandmother         unknown   • Other Maternal Grandfather         unknown   • Other Paternal Grandmother         unknown   • Other Paternal  "Grandfather         unknown   • No Known Problems Sister    • Sudden death Brother         murder   • No Known Problems Brother    • No Known Problems Brother    • No Known Problems Brother    • No Known Problems Sister    • Other Sister         unknown   • Other Sister         unknown   • No Known Problems Daughter    • No Known Problems Daughter    • No Known Problems Son    • No Known Problems Son    • No Known Problems Son    • No Known Problems Son         Social History     Socioeconomic History   • Marital status:      Spouse name: Merlin Wayne   • Number of children: Not on file   • Years of education: Not on file   • Highest education level: Not on file   Tobacco Use   • Smoking status: Never Smoker   • Smokeless tobacco: Never Used   Substance and Sexual Activity   • Alcohol use: No   • Drug use: No   • Sexual activity: Yes     Partners: Female     Birth control/protection: None     Comment: 1 female partner        No current outpatient medications on file prior to visit.     No current facility-administered medications on file prior to visit.       No Known Allergies     Visit Vitals  /64   Pulse 78   Temp 98.1 °F (36.7 °C) (Infrared)   Resp 16   Ht 177.8 cm (70\")   Wt 84 kg (185 lb 3.2 oz)   SpO2 95%   BMI 26.57 kg/m²      Body mass index is 26.57 kg/m².    Physical Exam  Constitutional:       General: He is not in acute distress.     Appearance: He is well-developed. He is not diaphoretic.   HENT:      Head: Atraumatic.   Cardiovascular:      Rate and Rhythm: Normal rate and regular rhythm.      Heart sounds: Normal heart sounds. No murmur heard.   No friction rub. No gallop.    Pulmonary:      Effort: Pulmonary effort is normal. No respiratory distress.      Breath sounds: Normal breath sounds. No stridor. No wheezing, rhonchi or rales.   Abdominal:      General: Bowel sounds are normal. There is no distension.      Palpations: Abdomen is soft. There is no mass.      Tenderness: There " is no abdominal tenderness. There is no guarding or rebound.      Hernia: No hernia is present.   Musculoskeletal:         General: Tenderness (mid-low back paraspinal tenderness to palpation ) present. No deformity.      Cervical back: Normal range of motion and neck supple. No tenderness.      Thoracic back: Tenderness present. Decreased range of motion (pain with extension of the spine ).      Lumbar back: No tenderness.        Back:       Right lower leg: No edema.      Left lower leg: No edema.   Skin:     General: Skin is warm and dry.   Neurological:      Mental Status: He is alert and oriented to person, place, and time.   Psychiatric:         Behavior: Behavior normal.               Problems Addressed this Visit        Gastrointestinal Abdominal     Screening for colon cancer    Relevant Orders    Ambulatory Referral For Screening Colonoscopy       Genitourinary and Reproductive     Screening for prostate cancer    Relevant Orders    PSA Screen (Completed)       Health Encounters    Well adult exam - Primary     The patient is here for health maintenance visit.  Currently, the patient consumes a healthy diet and has an adequate exercise regimen.  Screening lab work is ordered.  Immunizations were reviewed today.  Advice and education was given regarding nutrition, aerobic exercise, routine dental evaluations, routine eye exams, reproductive health, cardiovascular risk reduction, sunscreen use, self skin examination (annual dermatology evaluations) and seatbelt use (general overall safety).  Further recommendations will be given if needed after lab evaluation.  Annual wellness evaluation is recommended.           Relevant Orders    CBC & Differential (Completed)    Comprehensive Metabolic Panel (Completed)    Hemoglobin A1c (Completed)    Lipid Panel (Completed)    TSH Rfx On Abnormal To Free T4 (Completed)       Musculoskeletal and Injuries    Chronic midline low back pain without sciatica     Chronic mid  and low back pain likely secondary to degenerative disc disease and overuse.  Patient will continue continue supportive care at home using Tylenol and/or ibuprofen for pain.  He was also given lidocaine patch to try for pain relief.  Referral to physical therapy was placed for further evaluation and treatment.  Will obtain x-rays today.  RTC/ED precautions given         Relevant Medications    lidocaine (LIDODERM) 5 %    Other Relevant Orders    XR Spine Thoracic 3 View    XR Spine Lumbar Complete 4+VW    Ambulatory Referral to Physical Therapy Evaluate and treat    Chronic midline thoracic back pain    Relevant Medications    lidocaine (LIDODERM) 5 %    Other Relevant Orders    XR Spine Thoracic 3 View    XR Spine Lumbar Complete 4+VW    Ambulatory Referral to Physical Therapy Evaluate and treat      Diagnoses       Codes Comments    Well adult exam    -  Primary ICD-10-CM: Z00.00  ICD-9-CM: V70.0     Chronic midline low back pain without sciatica     ICD-10-CM: M54.5, G89.29  ICD-9-CM: 724.2, 338.29     Chronic midline thoracic back pain     ICD-10-CM: M54.6, G89.29  ICD-9-CM: 724.1, 338.29     Screening for prostate cancer     ICD-10-CM: Z12.5  ICD-9-CM: V76.44     Screening for colon cancer     ICD-10-CM: Z12.11  ICD-9-CM: V76.51           Return in about 1 year (around 6/14/2022) for Annual.    Parts of this office note have been dictated by voice recognition software. Grammatical and/or spelling errors may be present. I attest that I have reviewed the student note and that the components of the history of the present illness, the physical exam, and the assessment and plan documented were performed by me or were performed in my presence by the student and verified by me.      Jose Medeiros MD  6/17/2021

## 2021-06-15 LAB
ALBUMIN SERPL-MCNC: 4.4 G/DL (ref 3.5–5.2)
ALBUMIN/GLOB SERPL: 1.8 G/DL
ALP SERPL-CCNC: 61 U/L (ref 39–117)
ALT SERPL W P-5'-P-CCNC: 33 U/L (ref 1–41)
ANION GAP SERPL CALCULATED.3IONS-SCNC: 9.3 MMOL/L (ref 5–15)
AST SERPL-CCNC: 30 U/L (ref 1–40)
BASOPHILS # BLD AUTO: 0.03 10*3/MM3 (ref 0–0.2)
BASOPHILS NFR BLD AUTO: 0.6 % (ref 0–1.5)
BILIRUB SERPL-MCNC: 0.4 MG/DL (ref 0–1.2)
BUN SERPL-MCNC: 9 MG/DL (ref 8–23)
BUN/CREAT SERPL: 17 (ref 7–25)
CALCIUM SPEC-SCNC: 9.7 MG/DL (ref 8.6–10.5)
CHLORIDE SERPL-SCNC: 104 MMOL/L (ref 98–107)
CHOLEST SERPL-MCNC: 217 MG/DL (ref 0–200)
CO2 SERPL-SCNC: 22.7 MMOL/L (ref 22–29)
CREAT SERPL-MCNC: 0.53 MG/DL (ref 0.76–1.27)
DEPRECATED RDW RBC AUTO: 39.1 FL (ref 37–54)
EOSINOPHIL # BLD AUTO: 0.36 10*3/MM3 (ref 0–0.4)
EOSINOPHIL NFR BLD AUTO: 6.8 % (ref 0.3–6.2)
ERYTHROCYTE [DISTWIDTH] IN BLOOD BY AUTOMATED COUNT: 12.5 % (ref 12.3–15.4)
GFR SERPL CREATININE-BSD FRML MDRD: >150 ML/MIN/1.73
GLOBULIN UR ELPH-MCNC: 2.5 GM/DL
GLUCOSE SERPL-MCNC: 93 MG/DL (ref 65–99)
HBA1C MFR BLD: 5.72 % (ref 4.8–5.6)
HCT VFR BLD AUTO: 49.3 % (ref 37.5–51)
HDLC SERPL-MCNC: 57 MG/DL (ref 40–60)
HGB BLD-MCNC: 17 G/DL (ref 13–17.7)
IMM GRANULOCYTES # BLD AUTO: 0.01 10*3/MM3 (ref 0–0.05)
IMM GRANULOCYTES NFR BLD AUTO: 0.2 % (ref 0–0.5)
LDLC SERPL CALC-MCNC: 129 MG/DL (ref 0–100)
LDLC/HDLC SERPL: 2.19 {RATIO}
LYMPHOCYTES # BLD AUTO: 2.06 10*3/MM3 (ref 0.7–3.1)
LYMPHOCYTES NFR BLD AUTO: 38.8 % (ref 19.6–45.3)
MCH RBC QN AUTO: 29.6 PG (ref 26.6–33)
MCHC RBC AUTO-ENTMCNC: 34.5 G/DL (ref 31.5–35.7)
MCV RBC AUTO: 85.7 FL (ref 79–97)
MONOCYTES # BLD AUTO: 0.55 10*3/MM3 (ref 0.1–0.9)
MONOCYTES NFR BLD AUTO: 10.4 % (ref 5–12)
NEUTROPHILS NFR BLD AUTO: 2.3 10*3/MM3 (ref 1.7–7)
NEUTROPHILS NFR BLD AUTO: 43.2 % (ref 42.7–76)
NRBC BLD AUTO-RTO: 0 /100 WBC (ref 0–0.2)
PLATELET # BLD AUTO: 222 10*3/MM3 (ref 140–450)
PMV BLD AUTO: 10.4 FL (ref 6–12)
POTASSIUM SERPL-SCNC: 4.4 MMOL/L (ref 3.5–5.2)
PROT SERPL-MCNC: 6.9 G/DL (ref 6–8.5)
PSA SERPL-MCNC: 2.17 NG/ML (ref 0–4)
RBC # BLD AUTO: 5.75 10*6/MM3 (ref 4.14–5.8)
SODIUM SERPL-SCNC: 136 MMOL/L (ref 136–145)
TRIGL SERPL-MCNC: 177 MG/DL (ref 0–150)
TSH SERPL DL<=0.05 MIU/L-ACNC: 0.99 UIU/ML (ref 0.27–4.2)
VLDLC SERPL-MCNC: 31 MG/DL (ref 5–40)
WBC # BLD AUTO: 5.31 10*3/MM3 (ref 3.4–10.8)

## 2021-06-16 ENCOUNTER — TELEPHONE (OUTPATIENT)
Dept: FAMILY MEDICINE CLINIC | Facility: CLINIC | Age: 61
End: 2021-06-16

## 2021-06-16 NOTE — TELEPHONE ENCOUNTER
----- Message from Jose Medeiros MD sent at 6/15/2021  5:58 PM EDT -----  Please let the patient know that labs that were drawn at previous visit were stable. Patient should continue current treatment plan.  If patient has any questions they can contact me.

## 2021-06-17 NOTE — ASSESSMENT & PLAN NOTE
Chronic mid and low back pain likely secondary to degenerative disc disease and overuse.  Patient will continue continue supportive care at home using Tylenol and/or ibuprofen for pain.  He was also given lidocaine patch to try for pain relief.  Referral to physical therapy was placed for further evaluation and treatment.  Will obtain x-rays today.  RTC/ED precautions given

## 2021-07-21 ENCOUNTER — HOSPITAL ENCOUNTER (OUTPATIENT)
Dept: GENERAL RADIOLOGY | Facility: HOSPITAL | Age: 61
Discharge: HOME | End: 2021-07-21
Admitting: FAMILY MEDICINE

## 2021-07-21 DIAGNOSIS — G89.29 CHRONIC MIDLINE LOW BACK PAIN WITHOUT SCIATICA: ICD-10-CM

## 2021-07-21 DIAGNOSIS — M54.6 CHRONIC MIDLINE THORACIC BACK PAIN: ICD-10-CM

## 2021-07-21 DIAGNOSIS — G89.29 CHRONIC MIDLINE THORACIC BACK PAIN: ICD-10-CM

## 2021-07-21 DIAGNOSIS — M54.50 CHRONIC MIDLINE LOW BACK PAIN WITHOUT SCIATICA: ICD-10-CM

## 2021-07-21 PROCEDURE — 72110 X-RAY EXAM L-2 SPINE 4/>VWS: CPT

## 2021-07-21 PROCEDURE — 72072 X-RAY EXAM THORAC SPINE 3VWS: CPT

## 2021-07-27 ENCOUNTER — TELEPHONE (OUTPATIENT)
Dept: FAMILY MEDICINE CLINIC | Facility: CLINIC | Age: 61
End: 2021-07-27

## 2021-07-27 NOTE — TELEPHONE ENCOUNTER
Reason for Call: pt called and requested a call back from [facility]     Symptoms:     Onset (when it began):    Does anything make it better /  worse?    Have they tried anything?    Other pertinent info:    Okay to wait for PCP to address

## 2021-08-13 ENCOUNTER — OFFICE VISIT (OUTPATIENT)
Dept: FAMILY MEDICINE CLINIC | Facility: CLINIC | Age: 61
End: 2021-08-13

## 2021-08-13 VITALS
SYSTOLIC BLOOD PRESSURE: 106 MMHG | HEART RATE: 81 BPM | OXYGEN SATURATION: 98 % | HEIGHT: 70 IN | TEMPERATURE: 98.6 F | BODY MASS INDEX: 25.91 KG/M2 | DIASTOLIC BLOOD PRESSURE: 68 MMHG | WEIGHT: 181 LBS | RESPIRATION RATE: 18 BRPM

## 2021-08-13 DIAGNOSIS — Z86.69 HISTORY OF ITCHING OF EYE: Primary | ICD-10-CM

## 2021-08-13 PROCEDURE — 99213 OFFICE O/P EST LOW 20 MIN: CPT | Performed by: NURSE PRACTITIONER

## 2021-08-13 RX ORDER — OLOPATADINE HYDROCHLORIDE 1 MG/ML
1 SOLUTION/ DROPS OPHTHALMIC 2 TIMES DAILY
Qty: 1 EACH | Refills: 2 | Status: SHIPPED | OUTPATIENT
Start: 2021-08-13

## 2021-08-13 NOTE — PROGRESS NOTES
"Chief Complaint  Eye Problem    Subjective          Ulises Hairston presents to [facility]  Patient is a 62 yo male. He is here for complaint of both eyes . He speaks Irish - using [employer] language line solution.   He does not have an eye doctor - this has been on going off and on for 3 years seems seasonal. He denies any change in his vision. He is not currently taking any medication for the issue.      Eye Problem   Both eyes are affected.This is a recurrent problem. The current episode started more than 1 year ago. The problem occurs intermittently. The problem has been waxing and waning. Associated symptoms include eye redness and itching.       The following portions of the patient's history were reviewed and updated as appropriate: allergies, current medications, past family history, past medical history, past social history, past surgical history and problem list.    Review of Systems   Constitutional: Negative.    HENT: Negative.    Eyes: Positive for redness and itching.        Hx of allergic rhinitis has used pataday drops in the past.    Allergic/Immunologic: Positive for environmental allergies.   Neurological: Negative.    Psychiatric/Behavioral: Negative.          Objective   Vital Signs:   /68   Pulse 81   Temp 98.6 °F (37 °C)   Resp 18   Ht 177.8 cm (70\")   Wt 82.1 kg (181 lb)   SpO2 98%   BMI 25.97 kg/m²     Physical Exam  Vitals reviewed.   Constitutional:       Appearance: He is well-developed. He is not ill-appearing.   HENT:      Head: Normocephalic.      Nose: Rhinorrhea present.   Eyes:      General: No allergic shiner or visual field deficit.     Conjunctiva/sclera: Conjunctivae normal.      Pupils: Pupils are equal, round, and reactive to light.        Comments: Halo around tops of both eyes of blue rings -Bilateral   Left eye with a brown lesion    Cardiovascular:      Rate and Rhythm: Normal rate and regular rhythm.      Heart sounds: " Normal heart sounds.   Pulmonary:      Effort: Pulmonary effort is normal.      Breath sounds: Normal breath sounds.   Musculoskeletal:      Cervical back: Normal range of motion.   Lymphadenopathy:      Cervical: No cervical adenopathy.   Skin:     General: Skin is warm and dry.      Capillary Refill: Capillary refill takes less than 2 seconds.   Neurological:      Mental Status: He is alert.   Psychiatric:         Speech: Speech normal.         Behavior: Behavior normal. Behavior is cooperative.        Result Review :                 Assessment and Plan    Diagnoses and all orders for this visit:    1. History of itching of eye (Primary)  -     olopatadine (PATANOL) 0.1 % ophthalmic solution; Administer 1 drop into the left eye 2 (Two) Times a Day.  Dispense: 1 each; Refill: 2    Discussed following up with an ophthalmologist for further evaluation. He is agreeable. He is not diabetic. Needs eye exam. Patanol eye gtt.   OTC allergy medication such as loratadine or cetirizine per package instructions.           Follow Up   Return in about 4 weeks (around 9/10/2021), or eyes, for Recheck.  Patient was given instructions and counseling regarding his condition or for health maintenance advice. Please see specific information pulled into the AVS if appropriate.

## 2021-11-30 ENCOUNTER — TELEPHONE (OUTPATIENT)
Dept: FAMILY MEDICINE CLINIC | Facility: CLINIC | Age: 61
End: 2021-11-30

## 2021-11-30 NOTE — TELEPHONE ENCOUNTER
Caller: Ulises Hairston    Relationship: Self    Best call back number: [phone]    What does billing need from the patient: PATIENT CALLED REGARDING BILLING ISSUE FROM 8/13/21.    HUB TRIED TO CONNECT PATIENT TO CENTRAL BILLING.

## 2021-12-08 ENCOUNTER — OFFICE VISIT (OUTPATIENT)
Dept: FAMILY MEDICINE CLINIC | Facility: CLINIC | Age: 61
End: 2021-12-08

## 2021-12-08 VITALS
SYSTOLIC BLOOD PRESSURE: 110 MMHG | WEIGHT: 180 LBS | HEART RATE: 90 BPM | RESPIRATION RATE: 19 BRPM | TEMPERATURE: 98.2 F | DIASTOLIC BLOOD PRESSURE: 76 MMHG | BODY MASS INDEX: 25.83 KG/M2 | OXYGEN SATURATION: 98 %

## 2021-12-08 DIAGNOSIS — M54.6 MIDLINE THORACIC BACK PAIN, UNSPECIFIED CHRONICITY: Primary | ICD-10-CM

## 2021-12-08 PROCEDURE — 99213 OFFICE O/P EST LOW 20 MIN: CPT | Performed by: NURSE PRACTITIONER

## 2021-12-08 RX ORDER — CYCLOBENZAPRINE HCL 10 MG
10 TABLET ORAL NIGHTLY PRN
Qty: 30 TABLET | Refills: 0 | Status: SHIPPED | OUTPATIENT
Start: 2021-12-08

## 2021-12-08 RX ORDER — NAPROXEN 500 MG/1
TABLET ORAL
Qty: 60 TABLET | Refills: 1 | Status: SHIPPED | OUTPATIENT
Start: 2021-12-08 | End: 2022-04-07 | Stop reason: SDUPTHER

## 2021-12-08 NOTE — PROGRESS NOTES
Chief Complaint  Back Pain    Subjective          Ulises Hairston presents to [facility]  Patient is a 61-year-old male.  Patient's native language is Debbie. He can speak and understand English. He is declining  today.   He is here for complaint of mid upper back pain.  Started about 2 weeks ago.  It worsens with any activity.  He does help with his wife who is in a wheelchair most of the time.  He does cyst her and he will load and unload the wheelchair.  He has been taken ibuprofen with mild relief.  He denies any numbness or tingling down either arm.     Back Pain  This is a new problem. The current episode started 1 to 4 weeks ago. The problem has been waxing and waning since onset. The quality of the pain is described as aching. The pain does not radiate. The pain is at a severity of 4/10. The pain is mild. The symptoms are aggravated by bending. Risk factors include sedentary lifestyle. He has tried NSAIDs for the symptoms. The treatment provided mild relief.       The following portions of the patient's history were reviewed and updated as appropriate: allergies, current medications, past family history, past medical history, past social history, past surgical history and problem list.    Review of Systems   Constitutional: Positive for activity change.   HENT: Negative.    Respiratory: Negative.    Cardiovascular: Negative.    Gastrointestinal: Negative.    Genitourinary: Negative.    Musculoskeletal: Positive for back pain and myalgias.   Skin: Negative.    Allergic/Immunologic: Negative.    Neurological: Negative.    Hematological: Negative.    Psychiatric/Behavioral: Negative.          Objective   Vital Signs:   /76   Pulse 90   Temp 98.2 °F (36.8 °C)   Resp 19   Wt 81.6 kg (180 lb)   SpO2 98%   BMI 25.83 kg/m²     Physical Exam  Vitals reviewed.   Constitutional:       Appearance: He is well-developed. He is not ill-appearing.   HENT:       Head: Normocephalic.   Eyes:      Conjunctiva/sclera: Conjunctivae normal.      Pupils: Pupils are equal, round, and reactive to light.   Cardiovascular:      Rate and Rhythm: Normal rate and regular rhythm.      Heart sounds: Normal heart sounds.   Pulmonary:      Effort: Pulmonary effort is normal.      Breath sounds: Normal breath sounds.   Musculoskeletal:      Cervical back: Normal range of motion.      Thoracic back: Spasms and tenderness present. No swelling, edema, deformity, signs of trauma, lacerations or bony tenderness. Decreased range of motion. No scoliosis.        Back:    Lymphadenopathy:      Cervical: No cervical adenopathy.   Skin:     General: Skin is warm and dry.      Capillary Refill: Capillary refill takes less than 2 seconds.   Neurological:      Mental Status: He is alert and oriented to person, place, and time.   Psychiatric:         Mood and Affect: Mood normal.         Speech: Speech normal.         Behavior: Behavior normal. Behavior is cooperative.        Result Review :                 Assessment and Plan    Diagnoses and all orders for this visit:    1. Midline thoracic back pain, unspecified chronicity (Primary)  -     cyclobenzaprine (FLEXERIL) 10 MG tablet; Take 1 tablet by mouth At Night As Needed for Muscle Spasms. May take 1/2 to a whole tablet every 8 hours as needed  Dispense: 30 tablet; Refill: 0  -     naproxen (NAPROSYN) 500 MG tablet; 1 PO BID prn with food  Dispense: 60 tablet; Refill: 1    Stop ibuprofen.  Will try naproxen is a 12-hour medication.  Flexeril as needed at night.  Follow-up with PCP in 2 weeks if no improvement.  They need referral to PT.      Follow Up   Return in about 4 weeks (around 1/5/2022), or back with Dr. Medeiros, for Recheck.  Patient was given instructions and counseling regarding his condition or for health maintenance advice. Please see specific information pulled into the AVS if appropriate.

## 2022-04-07 DIAGNOSIS — M54.6 MIDLINE THORACIC BACK PAIN, UNSPECIFIED CHRONICITY: ICD-10-CM

## 2022-04-07 RX ORDER — NAPROXEN 500 MG/1
TABLET ORAL
Qty: 60 TABLET | Refills: 1 | Status: SHIPPED | OUTPATIENT
Start: 2022-04-07